# Patient Record
(demographics unavailable — no encounter records)

---

## 2025-02-06 NOTE — HISTORY OF PRESENT ILLNESS
[FreeTextEntry1] : 83 yo pt here for annual exam meds- remeron, nextlizet (chol med, nonstatin)  all-pcn, sulfa grandchild is transgender boy, at [place].  pt concerned re superficial skin cysts.  no other changes med history